# Patient Record
Sex: FEMALE | Race: WHITE
[De-identification: names, ages, dates, MRNs, and addresses within clinical notes are randomized per-mention and may not be internally consistent; named-entity substitution may affect disease eponyms.]

---

## 2018-05-07 ENCOUNTER — HOSPITAL ENCOUNTER (OUTPATIENT)
Dept: HOSPITAL 95 - LAB SHORT | Age: 78
Discharge: HOME | End: 2018-05-07
Attending: DERMATOLOGY
Payer: MEDICARE

## 2018-05-07 DIAGNOSIS — D22.71: Primary | ICD-10-CM

## 2018-05-07 DIAGNOSIS — L82.1: ICD-10-CM

## 2020-01-24 NOTE — NUR
01/24/20 9192 Riya Kulkarni
PT WAS NOTIFIED UPON ARRIVAL OF DELAY, SHE WAS UPDATED FREQUENTLY ON
APPROXIMATE START TIME. SHE HAD CALL LIGHT WITHIN REACH AND WAS GIVEN
AN ADDITIONAL BLANKET. BED WAS IN THE LOWEST LOCKED POSITION.

## 2020-01-24 NOTE — NUR
01/24/20 1502 Ivett Saldana
ASSUMED CARE OF PATIENT AT 1450.
PATIENT IS STABLE WITHOUT ANY PAIN.   IS AT SIDE, ICE PACK
UNDER RIGHT ANKLE.  PATIENT IS TOLERATING PO FLUIDS/FOOD.  PATIENT
WILL BE DISCHARGED HOME.

## 2024-09-02 NOTE — NUR
SHIFT SUMMARY
NO ACUTE EVENTS SINCE ARRIVAL TO PCU. PATIENT SLEPT T/O, EASILY AROUSABLE WITH
VERBAL STIMULI. MEDICATED X1 WITH IV FENTANYL FOR L CLAVICLE, RIB FX PAIN.
TELEMETRY SHOWING SINUS REBECCA 50s-60s. PATIENT DID EXPERIENCE X1 2.60 PAUSE,
PATIENT ASYMPTOMATIC DURING EPISODE. STRIP SCANNED TO CHART BY TELEMETRY TECH.
BP STABLE, SBP 150s-160s. REMAINS ON ROOM AIR, SATs >90%. RR REMAIN SHALLOW,
EVEN. MILD SHORTNESS OF BREATH WITH MOBILITY. UP TO BSC TO VOID WITH SBA FROM
STAFF. IVF INFUSING PER EMAR. CALL LIGHT IN REACH. WILL CONTINUE TO MONITOR
AND REPORT TO ONCOMING RN.

## 2024-09-02 NOTE — NUR
WHEN DISCUSSING CODE STATUS, PATIENT REQUESTING TO BE A DNR. CURRENT ORDER FOR
FULL CODE. EDUCATION PROVIDED REGARDING DNR STATUS - PATIENT VERBALIZES
UNDERSTANDING AND WISHES TO BE A DNR. MD CONTACTED. ORDER RECEIVED TO CHANGE
CODE STATUS TO DNR.

## 2024-09-02 NOTE — NUR
SHIFT SUMMARY
 
PT A&OX4. SP02>90% ON RA. INCENTIVE SPIROMETER IN ROOM, PT ENCOURAGED TO
USE. PT STATES "I GOT  THIS MORNING". PT CHANGED TO SURGICAL NO TELE
STATUS. HTN NOTED, PT STATES "NORMAL" FOR HER. HYDRALAZINE GIVEN X1 PER EMAR.
MD BROOKS IN ROOM TO CONSULT. MD BROWN IN ROOM TO CONSULT. MD BROWN W/
ORDERS FOR ARM SLING WHEN AMBULATING. PLACED L ARM IN SLING. PT IN ROOM TO
EVALUATE PT. PT ABLE TO AMBULATED 1P ASSIST W/ HEMIWALKER AND GB TO BATHROOM.
PT C/O OF PAIN, MEDICATED W/ TYLENOL PER EMAR. PT MEDICATED W/ NORCO PER EMAR
THIS EVENING, STATES THAT IS WHAT SHE TAKES AT HOME FOR PAIN. PT'S  AND
DAUGHTER IN ROOM THIS EVENING. PT SITTING IN CHAIR EATING DINNER. CALL LIGHT
IN REACH.

## 2024-09-02 NOTE — NUR
ARRIVAL TO PCU
AFTER RECEIVING REPORT FROM ED RN, PATIENT TRANSFERRED TO PCU AT APPROX 0030.
PATIENT TRANSFERRED FROM ED GURNEY TO BED VIA SLIDER SHEET. PATIENT ALERT AND
ORIENTED X4. COMMUNICATES NEEDS EFFECTIVELY. S/P GROUND LEVEL FALL AT HOME -
DENIES LOC. PERRLA.  MOVES ALL EXTREMITIES ON COMMAND WITH LIMITED MOBILITY TO
LUE DUE TO L CLAVICLE FX. MULTIPLE RIB FXs. REPORTS PAIN TOLERABLE AT 4/10 -
WILL CONTINUE TO MONITOR AND MEDICATE PER EMAR PRN. TELEMETRY SHOWING SINUS
60s-70s. SBP 160s - PATIENT REPORTS THAT IS HER BASELINE BP. PRN IV
HYDRALAZINE ORDERED FOR SBP >170. DENIES CHEST PRESSURE, PALPITATIONS. ON ROOM
AIR, SATs >90%. RR SHALLOW, EVEN, AND UNLABORED. INCENTIVE SPIROMETER AT
BEDSIDE - EDUCATED ON USE. PATIENT USES FWW AT BASELINE - UP TO BSC WITH SBA.
CALL LIGHT IN REACH. IVF AND IV POTASSIUM INFUSING PER EMAR.

## 2024-09-03 NOTE — NUR
INFORMED BY BREAK NURSE PT HAD A SIGNIFICANT DESAT WHILE SLEEPING.  O2 WAS
PLACED AND PT SATS IMPROVED UPON AWAKING.  PT ASSESSED AND O2 REMOVED AT THIS
TIME.  PT IS AWAKE, AOX4 IN BED, MILDLY TEARFUL AS THE EVENT AND SHOCK OF
BEING WOKEN UP HAS SCARED HER.  REASSURED PT.  PT HAS BEEN STABLE THROUGHOUT
THE SHIFT, NO PREVIOUS EPISODEDS OF LOW SATS.

## 2024-09-03 NOTE — NUR
PT WAS STABLE THROUGHOUT SHIFT UNTIL X1 EPISODE OF LOW O2 SATS WHILE SLEEPING.
 PT RECOVERED QUICKLY PER OTHER NURSING STAFF AFTER BEING WOKEN UP AND O2
APPLIED.  PT RE-ASSESSED AND O2 WAS REMOVED.  PT WAS MAINTAINING GOOD O2 SATS
PRIOR TO THIS AND HAS RESUMED MAINTAINING GOOD O2 SATS AFTER EVENT.  PT
REMAINS AOX4 WITH NO NEW COMPLAINTS. ICE PACK IS TO LEFT CLAVICLE.  PT WAS
WEARING A DNR BAND AND THAT HAS BEEN REMOVED AS PT WAS LISTED AS FULL CODE IN
THE ORDERS.

## 2024-09-03 NOTE — NUR
PT SLEEPING, EVEN, UNLABORED RESPIRATIONS NOTED.  BED IN LOWEST POSITION,
BRAKES ON, UPPER SIDES RAILS UP.  CALL LIGHT AND BED SIDE TABLE IN REACH.

## 2024-09-03 NOTE — NUR
PT DISCHARGED TO HOME WIHT DISCHARGE ORDERS AND HOME HEALTH. DAUGHTER OMKAR
MADE AWARE OF THE DISCHARGE PLAN. NO ACUTE CHANGE SICNE THE BEGINNING OF THE
SHIFT, VITALS HAS BEEN STABLE. PAIN CONTROL WITH PAIN MEDS AND ICE PACK. PT
ABLE TO PARTICIPATE WITH PT/OT. AMBUALTES TO THE BATHROOM VIA WALKER. NO OTHER
ISSUES REPORTED FOR THE SHIFT, ALL NEW MEDICATIONS AND DISCHARGE INSTRUCTIONS
DISLOSED WITH BOTH DAUGHTER AND THE PT. ALL BELONGINGS SENT WITH THE PT, PT
ACCOMPANIED VIA WHEELCHAIR UPON DISCHARGE